# Patient Record
Sex: FEMALE | Race: OTHER | NOT HISPANIC OR LATINO
[De-identification: names, ages, dates, MRNs, and addresses within clinical notes are randomized per-mention and may not be internally consistent; named-entity substitution may affect disease eponyms.]

---

## 2020-06-11 PROBLEM — Z00.00 ENCOUNTER FOR PREVENTIVE HEALTH EXAMINATION: Status: ACTIVE | Noted: 2020-06-11

## 2020-06-19 ENCOUNTER — APPOINTMENT (OUTPATIENT)
Dept: RHEUMATOLOGY | Facility: CLINIC | Age: 35
End: 2020-06-19
Payer: COMMERCIAL

## 2020-06-19 ENCOUNTER — LABORATORY RESULT (OUTPATIENT)
Age: 35
End: 2020-06-19

## 2020-06-19 VITALS
HEIGHT: 67 IN | SYSTOLIC BLOOD PRESSURE: 110 MMHG | WEIGHT: 187.39 LBS | OXYGEN SATURATION: 99 % | TEMPERATURE: 98.4 F | BODY MASS INDEX: 29.41 KG/M2 | DIASTOLIC BLOOD PRESSURE: 72 MMHG | HEART RATE: 72 BPM

## 2020-06-19 DIAGNOSIS — M25.50 PAIN IN UNSPECIFIED JOINT: ICD-10-CM

## 2020-06-19 DIAGNOSIS — M25.551 PAIN IN RIGHT HIP: ICD-10-CM

## 2020-06-19 DIAGNOSIS — Z80.3 FAMILY HISTORY OF MALIGNANT NEOPLASM OF BREAST: ICD-10-CM

## 2020-06-19 PROCEDURE — 99204 OFFICE O/P NEW MOD 45 MIN: CPT

## 2020-06-19 NOTE — PHYSICAL EXAM
[General Appearance - Alert] : alert [General Appearance - In No Acute Distress] : in no acute distress [Edema] : there was no peripheral edema [Sclera] : the sclera and conjunctiva were normal [Hearing Threshold Finger Rub Not Nance] : hearing was normal [Abdomen Soft] : soft [Abdomen Tenderness] : non-tender [No Spinal Tenderness] : no spinal tenderness [FreeTextEntry1] : small hypopigmented patch over left forearm  [No Focal Deficits] : no focal deficits [Oriented To Time, Place, And Person] : oriented to person, place, and time

## 2020-06-19 NOTE — ASSESSMENT
[FreeTextEntry1] : =Chronic generalized arthralgias\par non inflammatory in pattern, no evidence of inflammatory arthritis on exam today\par Mild elevated ESR 33, normal RF negative RHONA\par =Chronic low back pain, prolonged morning stiffness\par Family history of psoriasis, no clinical evidence of psoriasis at this time\par \par Recommend\par -Xrays of the L-spine and SI joints\par -Check HLA B27\par -NSAIDs as needed for pain\par \par RTO in 2-4 weeks\par \par \par Patient aware of my assessment and plan, all questions answered.\par

## 2020-06-19 NOTE — HISTORY OF PRESENT ILLNESS
[FreeTextEntry1] : 33 yo W, referred for evaluation of joint pains. \par \par =3 years ago\par bilateral knees and ankles\par no associated swelling redness or warmth\par low back pain, associated with morning stiffness for 30 min-1 hour\par no night time awakening, no numbness or weakness\par ice skating accident 3 years ago\par right hip pain started 3 months ago \par \par -was evaluated by PMD through televisits\par had blood work done\par ESR 33 \par RHONA, RF negative\par \par -gets occasional lesions under scalp, new left hand that she believes is psoriasis\par no inflammatory eye disease, IBD \par \par =FH: psoriasis

## 2020-06-19 NOTE — REVIEW OF SYSTEMS
[Fever] : no fever [Chills] : no chills [As Noted in HPI] : as noted in HPI [Negative] : Genitourinary

## 2020-06-19 NOTE — CONSULT LETTER
[Dear  ___] : Dear  [unfilled], [Consult Letter:] : I had the pleasure of evaluating your patient, [unfilled]. [Please see my note below.] : Please see my note below. [Sincerely,] : Sincerely, [FreeTextEntry3] : Vandana Arias MD\par  [FreeTextEntry2] : [facility] \par Dr Teddy López

## 2020-06-22 ENCOUNTER — APPOINTMENT (OUTPATIENT)
Dept: RADIOLOGY | Facility: CLINIC | Age: 35
End: 2020-06-22
Payer: COMMERCIAL

## 2020-06-22 ENCOUNTER — OUTPATIENT (OUTPATIENT)
Dept: OUTPATIENT SERVICES | Facility: HOSPITAL | Age: 35
LOS: 1 days | End: 2020-06-22
Payer: COMMERCIAL

## 2020-06-22 DIAGNOSIS — Z00.8 ENCOUNTER FOR OTHER GENERAL EXAMINATION: ICD-10-CM

## 2020-06-22 PROCEDURE — 73521 X-RAY EXAM HIPS BI 2 VIEWS: CPT | Mod: 26

## 2020-06-22 PROCEDURE — 72202 X-RAY EXAM SI JOINTS 3/> VWS: CPT | Mod: 26

## 2020-06-22 PROCEDURE — 72100 X-RAY EXAM L-S SPINE 2/3 VWS: CPT | Mod: 26

## 2020-06-22 PROCEDURE — 72202 X-RAY EXAM SI JOINTS 3/> VWS: CPT

## 2020-06-22 PROCEDURE — 73521 X-RAY EXAM HIPS BI 2 VIEWS: CPT

## 2020-06-22 PROCEDURE — 72100 X-RAY EXAM L-S SPINE 2/3 VWS: CPT

## 2020-06-26 LAB
25(OH)D3 SERPL-MCNC: 35.5 NG/ML
CCP AB SER IA-ACNC: <8 UNITS
CRP SERPL-MCNC: 0.17 MG/DL
ENA SS-A AB SER IA-ACNC: <0.2 AL
ENA SS-B AB SER IA-ACNC: <0.2 AL
HLA-B27 RELATED AG QL: NORMAL
RF+CCP IGG SER-IMP: NEGATIVE

## 2020-07-10 ENCOUNTER — APPOINTMENT (OUTPATIENT)
Dept: RHEUMATOLOGY | Facility: CLINIC | Age: 35
End: 2020-07-10

## 2020-07-10 DIAGNOSIS — G89.29 LOW BACK PAIN: ICD-10-CM

## 2020-07-10 DIAGNOSIS — M54.5 LOW BACK PAIN: ICD-10-CM

## 2020-07-10 NOTE — HISTORY OF PRESENT ILLNESS
[FreeTextEntry1] : 34 yo W, referred for evaluation of joint pains. \par \par =3 years ago\par bilateral knees and ankles\par no associated swelling redness or warmth\par low back pain, associated with morning stiffness for 30 min-1 hour\par no night time awakening, no numbness or weakness\par ice skating accident 3 years ago\par right hip pain started 3 months ago \par \par -was evaluated by PMD through televisits\par had blood work done\par ESR 33 \par RHONA, RF negative\par \par -gets occasional lesions under scalp, new left hand that she believes is psoriasis\par no inflammatory eye disease, IBD \par \par =FH: psoriasis \par

## 2020-07-10 NOTE — DATA REVIEWED
[FreeTextEntry1] : Labs done at last visit reviewed with patient \par \par CCP negative\par SSA/SSB negative\par normal CRP\par ESR canceled \par \par Xrays L-spine, hips and SI joints normal \par \par

## 2020-11-18 PROBLEM — M54.5 CHRONIC LOW BACK PAIN: Status: ACTIVE | Noted: 2020-06-19
